# Patient Record
Sex: MALE | URBAN - METROPOLITAN AREA
[De-identification: names, ages, dates, MRNs, and addresses within clinical notes are randomized per-mention and may not be internally consistent; named-entity substitution may affect disease eponyms.]

---
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
## 2023-08-03 NOTE — LETTER
[facility]  [address] [facility] [address]  [facility] [place] [facility] [address]  [phone]      May 14, 2018    RE:  Yuval Grimaldo                                                                                                                                                       [address] [facility] [address]            To whom it may concern:    Yuval Grimaldo is under my professional care for Bronchitis with influenza.      May return to work / school  with no restrictions when shortness of breath, severe cough, fevers and chills are resolved.  Influenza is usually infectious for 7-10 days.      Sincerely,        Jami Hughes MD    [facility]           Xray Neck Soft Tissue